# Patient Record
Sex: FEMALE | Race: WHITE | NOT HISPANIC OR LATINO | Employment: UNEMPLOYED | URBAN - METROPOLITAN AREA
[De-identification: names, ages, dates, MRNs, and addresses within clinical notes are randomized per-mention and may not be internally consistent; named-entity substitution may affect disease eponyms.]

---
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
## 2022-03-16 ENCOUNTER — OFFICE VISIT (OUTPATIENT)
Dept: FAMILY MEDICINE CLINIC | Facility: CLINIC | Age: 22
End: 2022-03-16
Payer: COMMERCIAL

## 2022-03-16 VITALS
HEIGHT: 66 IN | SYSTOLIC BLOOD PRESSURE: 116 MMHG | TEMPERATURE: 98.4 F | WEIGHT: 174.38 LBS | RESPIRATION RATE: 18 BRPM | HEART RATE: 100 BPM | BODY MASS INDEX: 28.03 KG/M2 | OXYGEN SATURATION: 98 % | DIASTOLIC BLOOD PRESSURE: 80 MMHG

## 2022-03-16 DIAGNOSIS — J02.9 PHARYNGITIS, UNSPECIFIED ETIOLOGY: Primary | ICD-10-CM

## 2022-03-16 PROCEDURE — 3008F BODY MASS INDEX DOCD: CPT | Performed by: FAMILY MEDICINE

## 2022-03-16 PROCEDURE — 3725F SCREEN DEPRESSION PERFORMED: CPT | Performed by: FAMILY MEDICINE

## 2022-03-16 PROCEDURE — 99202 OFFICE O/P NEW SF 15 MIN: CPT | Performed by: FAMILY MEDICINE

## 2022-03-16 PROCEDURE — 1036F TOBACCO NON-USER: CPT | Performed by: FAMILY MEDICINE

## 2022-03-16 RX ORDER — AZITHROMYCIN 250 MG/1
TABLET, FILM COATED ORAL
Qty: 6 TABLET | Refills: 0 | Status: SHIPPED | OUTPATIENT
Start: 2022-03-16 | End: 2022-03-21

## 2022-03-16 NOTE — ASSESSMENT & PLAN NOTE
Pharyngitis  Patient was given prescription for Zithromax Z-Chon take as directed for 5 days  She may use over-the-counter medications such as Robitussin DM for her cough and congestion    Patient will call if symptoms persist after medication completed

## 2022-03-16 NOTE — PROGRESS NOTES
FAMILY PRACTICE OFFICE VISIT       NAME: Marlin Sprague  AGE: 25 y o  SEX: female       : 2000        MRN: [id number]    DATE: 3/16/2022  TIME: 11:22 AM    Assessment and Plan     Problem List Items Addressed This Visit        Digestive    Pharyngitis - Primary     Pharyngitis  Patient was given prescription for Zithromax Z-Chon take as directed for 5 days  She may use over-the-counter medications such as Robitussin DM for her cough and congestion  Patient will call if symptoms persist after medication completed         Relevant Medications    azithromycin (Zithromax) 250 mg tablet              Chief Complaint     Chief Complaint   Patient presents with    [facility]     new pt     Sore Throat     x2 days    Cough     x 3 days     Chills     x 2 days    Nausea     x 1day       History of Present Illness     Patient states a few days ago she developed significant coughing and sore throat  She denies any documented fevers  She did have a negative COVID test yesterday  She is a student at [employer]  Patient does have a history of prior strep throat infections    Sore Throat   Associated symptoms include coughing  Cough  Associated symptoms include a sore throat  Pertinent negatives include no fever  Nausea  Associated symptoms include coughing, nausea and a sore throat  Pertinent negatives include no fever  Review of Systems   Review of Systems   Constitutional: Negative for fever  HENT: Positive for sore throat  Respiratory: Positive for cough  Gastrointestinal: Positive for nausea  Active Problem List     Patient Active Problem List   Diagnosis    Pharyngitis       Past Medical History:  Past Medical History:   Diagnosis Date    Asthma        Past Surgical History:  History reviewed  No pertinent surgical history      Family History:  Family History   Problem Relation Age of Onset    No Known Problems Mother     Hypertension Father     Diabetes Father     No Known Problems Brother        Social History:  Social History     Socioeconomic History    Marital status: Single     Spouse name: Not on file    Number of children: Not on file    Years of education: Not on file    Highest education level: Not on file   Occupational History    Not on file   Tobacco Use    Smoking status: Never Smoker    Smokeless tobacco: Never Used   Substance and Sexual Activity    Alcohol use: Yes    Drug use: Never    Sexual activity: Not on file   Other Topics Concern    Not on file   Social History Narrative    Not on file     Social Determinants of Health     Financial Resource Strain: Not on file   Food Insecurity: Not on file   Transportation Needs: Not on file   Physical Activity: Not on file   Stress: Not on file   Social Connections: Not on file   Intimate Partner Violence: Not on file   Housing Stability: Not on file       Objective     Vitals:    03/16/22 1033   BP: 116/80   Pulse: 100   Resp: 18   Temp: 98 4 °F (36 9 °C)   SpO2: 98%     Wt Readings from Last 3 Encounters:   03/16/22 79 1 kg (174 lb 6 oz) (93 %, Z= 1 50)*   02/05/18 68 kg (150 lb) (89 %, Z= 1 23)*   04/10/17 63 6 kg (140 lb 3 2 oz) (86 %, Z= 1 09)*     * Growth percentiles are based on CDC (Girls, 2-20 Years) data  Physical Exam  Constitutional:       General: She is not in acute distress  Appearance: She is well-developed  She is not ill-appearing  HENT:      Head: Normocephalic and atraumatic  Right Ear: Tympanic membrane, ear canal and external ear normal  There is no impacted cerumen  Left Ear: Tympanic membrane, ear canal and external ear normal  There is no impacted cerumen  Mouth/Throat:      Mouth: Mucous membranes are moist       Pharynx: Oropharyngeal exudate and posterior oropharyngeal erythema present  Tonsils: Tonsillar exudate present  Eyes:      General:         Right eye: No discharge  Left eye: No discharge        Extraocular Movements: Extraocular movements intact  Conjunctiva/sclera: Conjunctivae normal       Pupils: Pupils are equal, round, and reactive to light  Lymphadenopathy:      Cervical: Cervical adenopathy present  Neurological:      Mental Status: She is alert  Pertinent Laboratory/Diagnostic Studies:  No results found for: GLUCOSE, BUN, CREATININE, CALCIUM, NA, K, CO2, CL  No results found for: ALT, AST, GGT, ALKPHOS, BILITOT    No results found for: WBC, HGB, HCT, MCV, PLT    No results found for: TSH    No results found for: CHOL  No results found for: TRIG  No results found for: HDL  No results found for: LDLCALC  No results found for: HGBA1C    Results for orders placed or performed in visit on 12/27/21   [facility]    Specimen: Nose; Nares   Result Value Ref Range    SARS-CoV-2 Positive (A) Negative       No orders of the defined types were placed in this encounter  ALLERGIES:  Allergies   Allergen Reactions    Amoxicillin Hives       Current Medications     Current Outpatient Medications   Medication Sig Dispense Refill    azithromycin (Zithromax) 250 mg tablet Take 2 tablets (500 mg total) by mouth daily for 1 day, THEN 1 tablet (250 mg total) daily for 4 days  6 tablet 0    cetirizine (ZyrTEC) 10 mg tablet Take 1 tablet (10 mg total) by mouth daily for 10 doses As needed for nasal congestion and postnasal drip 10 tablet 0     No current facility-administered medications for this visit           Health Maintenance     Health Maintenance   Topic Date Due    Hepatitis C Screening  Never done    DTaP,Tdap,and Td Vaccines (1 - Tdap) Never done    HPV Vaccine (1 - 2-dose series) Never done    HIV Screening  Never done    BMI: Followup Plan  Never done    Annual Physical  Never done    Influenza Vaccine (1) Never done    COVID-19 Vaccine (3 - Booster for Pfizer series) 10/09/2021    Depression Screening  03/16/2023    BMI: Adult  03/16/2023    Pneumococcal Vaccine: Pediatrics (0 to 5 Years) and At-Risk Patients (6 to 59 Years)  Aged Out    HIB Vaccine  Aged Out    Hepatitis B Vaccine  Aged Out    IPV Vaccine  Aged Out    Hepatitis A Vaccine  Aged Out    Meningococcal ACWY Vaccine  Aged [employer] History   Administered Date(s) Administered    COVID-19 PFIZER VACCINE 0 3 ML IM 04/18/2021, 04/01/0414       Antoine Moctezuma MD

## 2022-08-15 ENCOUNTER — TELEPHONE (OUTPATIENT)
Dept: FAMILY MEDICINE CLINIC | Facility: CLINIC | Age: 22
End: 2022-08-15

## 2022-08-16 ENCOUNTER — CLINICAL SUPPORT (OUTPATIENT)
Dept: FAMILY MEDICINE CLINIC | Facility: CLINIC | Age: 22
End: 2022-08-16
Payer: COMMERCIAL

## 2022-08-16 DIAGNOSIS — Z11.1 TUBERCULOSIS SCREENING: Primary | ICD-10-CM

## 2022-08-16 PROCEDURE — 86580 TB INTRADERMAL TEST: CPT

## 2023-01-05 ENCOUNTER — TELEPHONE (OUTPATIENT)
Dept: FAMILY MEDICINE CLINIC | Facility: CLINIC | Age: 23
End: 2023-01-05

## 2023-01-09 ENCOUNTER — CLINICAL SUPPORT (OUTPATIENT)
Dept: FAMILY MEDICINE CLINIC | Facility: CLINIC | Age: 23
End: 2023-01-09

## 2023-01-09 DIAGNOSIS — Z11.1 TUBERCULOSIS SCREENING: Primary | ICD-10-CM

## 2023-01-11 ENCOUNTER — TELEPHONE (OUTPATIENT)
Dept: FAMILY MEDICINE CLINIC | Facility: CLINIC | Age: 23
End: 2023-01-11

## 2023-01-11 LAB
INDURATION: NORMAL MM
TB SKIN TEST: NEGATIVE

## 2023-01-11 NOTE — TELEPHONE ENCOUNTER
----- Message from James Schmidt MD sent at 9/78/5519 12:34 PM EST -----  Recent tuberculosis test was negative

## 2023-04-25 ENCOUNTER — OFFICE VISIT (OUTPATIENT)
Dept: FAMILY MEDICINE CLINIC | Facility: CLINIC | Age: 23
End: 2023-04-25

## 2023-04-25 ENCOUNTER — APPOINTMENT (OUTPATIENT)
Dept: LAB | Facility: CLINIC | Age: 23
End: 2023-04-25

## 2023-04-25 VITALS
HEART RATE: 81 BPM | HEIGHT: 67 IN | WEIGHT: 181 LBS | TEMPERATURE: 98 F | OXYGEN SATURATION: 97 % | RESPIRATION RATE: 16 BRPM | BODY MASS INDEX: 28.41 KG/M2

## 2023-04-25 DIAGNOSIS — J02.9 PHARYNGITIS, UNSPECIFIED ETIOLOGY: ICD-10-CM

## 2023-04-25 DIAGNOSIS — J02.9 PHARYNGITIS, UNSPECIFIED ETIOLOGY: Primary | ICD-10-CM

## 2023-04-25 RX ORDER — CEPHALEXIN 500 MG/1
500 CAPSULE ORAL EVERY 12 HOURS SCHEDULED
Qty: 14 CAPSULE | Refills: 0 | Status: CANCELLED | OUTPATIENT
Start: 2023-04-25 | End: 2023-05-02

## 2023-04-25 NOTE — LETTER
April 25, 2023     Patient: Josemanuel Ramírez  YOB: 2000  Date of Visit: 4/25/2023      To Whom it May Concern: Josemanuel Ramírez is under my professional care  Queta Oviedo was seen in my office on 4/25/2023  Queta Oviedo may return to school and work on 4/27/23  If you have any questions or concerns, please don't hesitate to call           Sincerely,          Raisa Massey,         CC: No Recipients

## 2023-04-25 NOTE — ASSESSMENT & PLAN NOTE
Recently completed a course of Azithromycin in the last few days  Rapid strep negative today  Will screen for Mono  Treat symptoms otherwise - saltwater gargle, lozenges, chloraseptic, honey in tea, mucinex, tylenol/ibuprofen

## 2023-04-25 NOTE — PROGRESS NOTES
"Name: Sangita Maza      : 2000      MRN: [id number]  Encounter Provider: Conchita Peter DO  Encounter Date: 2023   Encounter department: [address] [place] [address] Dr     1  Pharyngitis, unspecified etiology  Assessment & Plan:  Recently completed a course of Azithromycin in the last few days  Rapid strep negative today  Will screen for Mono  Treat symptoms otherwise - saltwater gargle, lozenges, chloraseptic, honey in tea, mucinex, tylenol/ibuprofen  Orders:  -     Mononucleosis screen; Future         Subjective      HPI   Had suspected strep last week, took Azithromycin over the last week  Then developed symptoms again after completing the course of antibiotics  Sore throat is resolved but she developed subjective fever, fatigue  Today she is having hot flashes throughout the day  She went to work today at [employer]  R side of throat feels swollen compared to left  She is developing a cough as well  No previous COVID test      Review of Systems   Constitutional: Positive for chills and fatigue  Negative for fever  HENT: Positive for congestion and sore throat  Respiratory: Negative for cough  Current Outpatient Medications on File Prior to Visit   Medication Sig   • cetirizine (ZyrTEC) 10 mg tablet Take 1 tablet (10 mg total) by mouth daily for 10 doses As needed for nasal congestion and postnasal drip       Objective     Pulse 81   Temp 98 °F (36 7 °C) (Temporal)   Resp 16   Ht 5' 7\" (1 702 m)   Wt 82 1 kg (181 lb)   SpO2 97%   BMI 28 35 kg/m²     Physical Exam  Vitals reviewed  Constitutional:       Appearance: She is well-developed  HENT:      Head: Normocephalic and atraumatic  Nose: No congestion or rhinorrhea  Mouth/Throat:      Mouth: Mucous membranes are moist       Pharynx: Posterior oropharyngeal erythema present  Tonsils: Tonsillar exudate present     Eyes:      Conjunctiva/sclera: Conjunctivae normal    Musculoskeletal:    " Cervical back: Normal range of motion and neck supple  Lymphadenopathy:      Cervical: Cervical adenopathy present  Skin:     General: Skin is warm and dry  Neurological:      Mental Status: She is alert     Psychiatric:         Mood and Affect: Mood normal          Behavior: Behavior normal        Clide Hammed, DO

## 2023-04-26 ENCOUNTER — APPOINTMENT (EMERGENCY)
Dept: CT IMAGING | Facility: HOSPITAL | Age: 23
End: 2023-04-26

## 2023-04-26 ENCOUNTER — HOSPITAL ENCOUNTER (EMERGENCY)
Facility: HOSPITAL | Age: 23
Discharge: HOME | End: 2023-04-26
Attending: EMERGENCY MEDICINE | Admitting: EMERGENCY MEDICINE

## 2023-04-26 VITALS
RESPIRATION RATE: 18 BRPM | SYSTOLIC BLOOD PRESSURE: 123 MMHG | HEART RATE: 91 BPM | DIASTOLIC BLOOD PRESSURE: 76 MMHG | BODY MASS INDEX: 29.35 KG/M2 | TEMPERATURE: 98.8 F | OXYGEN SATURATION: 97 % | WEIGHT: 187.39 LBS

## 2023-04-26 DIAGNOSIS — G93.89 SUPRASELLAR MASS: ICD-10-CM

## 2023-04-26 DIAGNOSIS — J03.90 TONSILLITIS: Primary | ICD-10-CM

## 2023-04-26 LAB
ALBUMIN SERPL BCP-MCNC: 3.6 G/DL (ref 3.5–5)
ALP SERPL-CCNC: 59 U/L (ref 34–104)
ALT SERPL W P-5'-P-CCNC: 13 U/L (ref 7–52)
ANION GAP SERPL CALCULATED.3IONS-SCNC: 7 MMOL/L (ref 4–13)
AST SERPL W P-5'-P-CCNC: 16 U/L (ref 13–39)
BASOPHILS # BLD AUTO: 0.02 THOUSANDS/ΜL (ref 0–0.1)
BASOPHILS NFR BLD AUTO: 0 % (ref 0–1)
BILIRUB SERPL-MCNC: 0.57 MG/DL (ref 0.2–1)
BUN SERPL-MCNC: 8 MG/DL (ref 5–25)
CALCIUM SERPL-MCNC: 8.3 MG/DL (ref 8.4–10.2)
CHLORIDE SERPL-SCNC: 101 MMOL/L (ref 96–108)
CO2 SERPL-SCNC: 26 MMOL/L (ref 21–32)
CREAT SERPL-MCNC: 0.65 MG/DL (ref 0.6–1.3)
EOSINOPHIL # BLD AUTO: 0 THOUSAND/ΜL (ref 0–0.61)
EOSINOPHIL NFR BLD AUTO: 0 % (ref 0–6)
ERYTHROCYTE [DISTWIDTH] IN BLOOD BY AUTOMATED COUNT: 11.8 % (ref 11.6–15.1)
GFR SERPL CREATININE-BSD FRML MDRD: 128 ML/MIN/1.73SQ M
GLUCOSE SERPL-MCNC: 95 MG/DL (ref 65–140)
HCT VFR BLD AUTO: 36.7 % (ref 34.8–46.1)
HETEROPH AB SER QL: NEGATIVE
HGB BLD-MCNC: 12.1 G/DL (ref 11.5–15.4)
IMM GRANULOCYTES # BLD AUTO: 0.04 THOUSAND/UL (ref 0–0.2)
IMM GRANULOCYTES NFR BLD AUTO: 1 % (ref 0–2)
LYMPHOCYTES # BLD AUTO: 1.3 THOUSANDS/ΜL (ref 0.6–4.47)
LYMPHOCYTES NFR BLD AUTO: 18 % (ref 14–44)
MCH RBC QN AUTO: 29.1 PG (ref 26.8–34.3)
MCHC RBC AUTO-ENTMCNC: 33 G/DL (ref 31.4–37.4)
MCV RBC AUTO: 88 FL (ref 82–98)
MONOCYTES # BLD AUTO: 0.57 THOUSAND/ΜL (ref 0.17–1.22)
MONOCYTES NFR BLD AUTO: 8 % (ref 4–12)
NEUTROPHILS # BLD AUTO: 5.34 THOUSANDS/ΜL (ref 1.85–7.62)
NEUTS SEG NFR BLD AUTO: 73 % (ref 43–75)
NRBC BLD AUTO-RTO: 0 /100 WBCS
PLATELET # BLD AUTO: 214 THOUSANDS/UL (ref 149–390)
PMV BLD AUTO: 9.1 FL (ref 8.9–12.7)
POTASSIUM SERPL-SCNC: 3.3 MMOL/L (ref 3.5–5.3)
PROT SERPL-MCNC: 7.2 G/DL (ref 6.4–8.4)
RBC # BLD AUTO: 4.16 MILLION/UL (ref 3.81–5.12)
S PYO DNA THROAT QL NAA+PROBE: NOT DETECTED
SODIUM SERPL-SCNC: 134 MMOL/L (ref 135–147)
WBC # BLD AUTO: 7.27 THOUSAND/UL (ref 4.31–10.16)

## 2023-04-26 RX ORDER — DEXAMETHASONE SODIUM PHOSPHATE 4 MG/ML
10 INJECTION, SOLUTION INTRA-ARTICULAR; INTRALESIONAL; INTRAMUSCULAR; INTRAVENOUS; SOFT TISSUE ONCE
Status: COMPLETED | OUTPATIENT
Start: 2023-04-26 | End: 2023-04-26

## 2023-04-26 RX ORDER — ACETAMINOPHEN 325 MG/1
975 TABLET ORAL ONCE
Status: COMPLETED | OUTPATIENT
Start: 2023-04-26 | End: 2023-04-26

## 2023-04-26 RX ORDER — KETOROLAC TROMETHAMINE 30 MG/ML
15 INJECTION, SOLUTION INTRAMUSCULAR; INTRAVENOUS ONCE
Status: COMPLETED | OUTPATIENT
Start: 2023-04-26 | End: 2023-04-26

## 2023-04-26 RX ORDER — CLINDAMYCIN HYDROCHLORIDE 300 MG/1
300 CAPSULE ORAL 3 TIMES DAILY
Qty: 29 CAPSULE | Refills: 0 | Status: SHIPPED | OUTPATIENT
Start: 2023-04-26 | End: 2023-05-06

## 2023-04-26 RX ORDER — CLINDAMYCIN PHOSPHATE 600 MG/50ML
600 INJECTION INTRAVENOUS ONCE
Status: COMPLETED | OUTPATIENT
Start: 2023-04-26 | End: 2023-04-26

## 2023-04-26 RX ADMIN — ACETAMINOPHEN 975 MG: 325 TABLET ORAL at 20:53

## 2023-04-26 RX ADMIN — DEXAMETHASONE SODIUM PHOSPHATE 10 MG: 4 INJECTION, SOLUTION INTRAMUSCULAR; INTRAVENOUS at 21:08

## 2023-04-26 RX ADMIN — SODIUM CHLORIDE 1000 ML: 0.9 INJECTION, SOLUTION INTRAVENOUS at 21:14

## 2023-04-26 RX ADMIN — CLINDAMYCIN PHOSPHATE 600 MG: 600 INJECTION, SOLUTION INTRAVENOUS at 22:51

## 2023-04-26 RX ADMIN — IOHEXOL 85 ML: 350 INJECTION, SOLUTION INTRAVENOUS at 21:49

## 2023-04-26 RX ADMIN — KETOROLAC TROMETHAMINE 15 MG: 30 INJECTION, SOLUTION INTRAMUSCULAR at 21:04

## 2023-04-26 NOTE — Clinical Note
Mini Delvalle was seen and treated in our emergency department on 4/26/2023  Diagnosis:     Louise Desir  may return to work on return date, may return to school on return date  She may return on this date: 05/01/2023         If you have any questions or concerns, please don't hesitate to call        Nadeem Hyman MD    ______________________________           _______________          _______________  Hospital Representative                              Date                                Time

## 2023-04-27 ENCOUNTER — OFFICE VISIT (OUTPATIENT)
Dept: FAMILY MEDICINE CLINIC | Facility: CLINIC | Age: 23
End: 2023-04-27

## 2023-04-27 ENCOUNTER — HOSPITAL ENCOUNTER (OUTPATIENT)
Dept: MRI IMAGING | Facility: HOSPITAL | Age: 23
Discharge: HOME | End: 2023-04-27

## 2023-04-27 VITALS
TEMPERATURE: 98 F | DIASTOLIC BLOOD PRESSURE: 80 MMHG | SYSTOLIC BLOOD PRESSURE: 120 MMHG | WEIGHT: 182 LBS | RESPIRATION RATE: 16 BRPM | BODY MASS INDEX: 28.56 KG/M2 | HEIGHT: 67 IN | HEART RATE: 120 BPM | OXYGEN SATURATION: 98 %

## 2023-04-27 DIAGNOSIS — R93.89 ABNORMAL CT SCAN: ICD-10-CM

## 2023-04-27 DIAGNOSIS — R93.89 ABNORMAL CT SCAN: Primary | ICD-10-CM

## 2023-04-27 DIAGNOSIS — J03.90 TONSILLITIS: ICD-10-CM

## 2023-04-27 LAB — HETEROPH AB SER QL: NEGATIVE

## 2023-04-27 RX ADMIN — GADOBUTROL 8 ML: 604.72 INJECTION INTRAVENOUS at 14:46

## 2023-04-27 NOTE — ED PROVIDER NOTES
History  Chief Complaint   Patient presents with   • Sore Throat - Complicated     Pt reports sore throat and swollen tonsils with intermittent fatigue, dizziness, SOB, chills, fevers, abdominal pain for 2 weeks  Reports being probably diagnosed with strep last week, was cleared of [place] yesterday  Pt finished zpac last Friday  74-year-old female presents to the emergency department with chief complaint of sore throat that began 2 weeks ago  Patient states that she is been experiencing sore throat, fatigue, fevers for roughly 2 weeks, was evaluated by her primary care doctor told that it was most likely strep placed on a Z-Chon, but also had a monotest that came back negative  Patient notes that she also has experienced posttussive emesis, but has also had an occasional gag induced emesis, and 1 episode of emesis that was precipitated by nausea  Patient notes that she has been trying to take Tylenol as well as ibuprofen with minimal improvement of her fever  States that she is currently on her menses  Patient denies any chest pain, or shortness of breath, however feels as it is slightly difficult to have a deep breath due to the swelling in her throat  Patient states that she has not had any change in urination, or in bowel habits, rash, or any other complaints at this time  Prior to Admission Medications   Prescriptions Last Dose Informant Patient Reported? Taking? cetirizine (ZyrTEC) 10 mg tablet   No No   Sig: Take 1 tablet (10 mg total) by mouth daily for 10 doses As needed for nasal congestion and postnasal drip      Facility-Administered Medications: None       Past Medical History:   Diagnosis Date   • Asthma        History reviewed  No pertinent surgical history      Family History   Problem Relation Age of Onset   • No Known Problems Mother    • Hypertension Father    • Diabetes Father    • No Known Problems Brother      I have reviewed and agree with the history as documented  E-Cigarette/Vaping   • E-Cigarette Use Never User      E-Cigarette/Vaping Substances   • Nicotine No    • THC No    • CBD No    • Flavoring No    • Other No    • Unknown No      Social History     Tobacco Use   • Smoking status: Never   • Smokeless tobacco: Never   Vaping Use   • Vaping Use: Never used   Substance Use Topics   • Alcohol use: Yes   • Drug use: Never        Review of Systems   Constitutional: Positive for appetite change (decreased), chills, fatigue and fever  HENT: Positive for sore throat and trouble swallowing (odynophagia)  Negative for congestion, drooling and ear pain  Eyes: Negative for pain and visual disturbance  Respiratory: Positive for cough (non-productive)  Negative for shortness of breath  Cardiovascular: Negative for chest pain and palpitations  Gastrointestinal: Positive for abdominal pain, nausea and vomiting  Genitourinary: Negative for dysuria and hematuria  Musculoskeletal: Negative for arthralgias and back pain  Skin: Negative for color change and rash  Neurological: Negative for seizures and syncope  All other systems reviewed and are negative  Physical Exam  ED Triage Vitals   Temperature Pulse Respirations Blood Pressure SpO2   04/26/23 2005 04/26/23 2005 04/26/23 2005 04/26/23 2005 04/26/23 2005   100 5 °F (38 1 °C) (!) 120 18 127/95 96 %      Temp Source Heart Rate Source Patient Position - Orthostatic VS BP Location FiO2 (%)   04/26/23 2005 04/26/23 2005 04/26/23 2253 04/26/23 2253 --   Oral Monitor Lying Right arm       Pain Score       04/26/23 2053       7             Orthostatic Vital Signs  Vitals:    04/26/23 2005 04/26/23 2253   BP: 127/95 123/76   Pulse: (!) 120 91   Patient Position - Orthostatic VS:  Lying       Physical Exam  Vitals and nursing note reviewed  Constitutional:       Appearance: She is well-developed  Comments: Uncomfortable appearing   HENT:      Head: Normocephalic and atraumatic        Right Ear: Tympanic membrane, ear canal and external ear normal  There is no impacted cerumen  Left Ear: Tympanic membrane, ear canal and external ear normal  There is no impacted cerumen  Nose: Nose normal       Mouth/Throat:      Pharynx: Oropharyngeal exudate and posterior oropharyngeal erythema present  Comments: 2+ tonsillar hypertrophy  Eyes:      General: No scleral icterus  Right eye: No discharge  Left eye: No discharge  Conjunctiva/sclera: Conjunctivae normal    Cardiovascular:      Rate and Rhythm: Normal rate and regular rhythm  Heart sounds: No murmur heard  Pulmonary:      Effort: Pulmonary effort is normal  No respiratory distress  Breath sounds: Normal breath sounds  Abdominal:      General: Abdomen is flat  Bowel sounds are normal       Palpations: Abdomen is soft  Tenderness: There is abdominal tenderness (Mild suprapubic)  There is no right CVA tenderness, left CVA tenderness, guarding or rebound  Musculoskeletal:         General: No swelling  Cervical back: Normal range of motion and neck supple  No tenderness  Lymphadenopathy:      Cervical: Cervical adenopathy (Soft, rubbery, mobile anterior chain) present  Skin:     General: Skin is warm and dry  Capillary Refill: Capillary refill takes less than 2 seconds  Neurological:      Mental Status: She is alert     Psychiatric:         Mood and Affect: Mood normal          ED Medications  Medications   acetaminophen (TYLENOL) tablet 975 mg (975 mg Oral Given 4/26/23 2053)   sodium chloride 0 9 % bolus 1,000 mL (0 mL Intravenous Stopped 4/26/23 2337)   dexamethasone (DECADRON) injection 10 mg (10 mg Intravenous Given 4/26/23 2108)   ketorolac (TORADOL) injection 15 mg (15 mg Intravenous Given 4/26/23 2104)   iohexol (OMNIPAQUE) 350 MG/ML injection (SINGLE-DOSE) 85 mL (85 mL Intravenous Given 4/26/23 2149)   clindamycin (CLEOCIN) IVPB (premix in dextrose) 600 mg 50 mL (0 mg Intravenous Stopped 4/26/23 2326)       Diagnostic Studies  Results Reviewed     Procedure Component Value Units Date/Time    Strep A PCR [[id number]]  (Normal) Collected: 04/26/23 2052    Lab Status: Final result Specimen: Throat Updated: 04/26/23 2149     STREP A PCR Not Detected    Comprehensive metabolic panel [[id number]]  (Abnormal) Collected: 04/26/23 2102    Lab Status: Final result Specimen: Blood from Arm, Right Updated: 04/26/23 2138     Sodium 134 mmol/L      Potassium 3 3 mmol/L      Chloride 101 mmol/L      CO2 26 mmol/L      ANION GAP 7 mmol/L      BUN 8 mg/dL      Creatinine 0 65 mg/dL      Glucose 95 mg/dL      Calcium 8 3 mg/dL      AST 16 U/L      ALT 13 U/L      Alkaline Phosphatase 59 U/L      Total Protein 7 2 g/dL      Albumin 3 6 g/dL      Total Bilirubin 0 57 mg/dL      eGFR 128 ml/min/1 73sq m     Narrative:      [place] guidelines for Chronic Kidney Disease (CKD):   •  Stage 1 with normal or high GFR (GFR > 90 mL/min/1 73 square meters)  •  Stage 2 Mild CKD (GFR = 60-89 mL/min/1 73 square meters)  •  Stage 3A Moderate CKD (GFR = 45-59 mL/min/1 73 square meters)  •  Stage 3B Moderate CKD (GFR = 30-44 mL/min/1 73 square meters)  •  Stage 4 Severe CKD (GFR = 15-29 mL/min/1 73 square meters)  •  Stage 5 End Stage CKD (GFR <15 mL/min/1 73 square meters)  Note: GFR calculation is accurate only with a steady state creatinine    CBC and differential [[id number]] Collected: 04/26/23 2102    Lab Status: Final result Specimen: Blood from Arm, Right Updated: 04/26/23 2125     WBC 7 27 Thousand/uL      RBC 4 16 Million/uL      Hemoglobin 12 1 g/dL      Hematocrit 36 7 %      MCV 88 fL      MCH 29 1 pg      MCHC 33 0 g/dL      RDW 11 8 %      MPV 9 1 fL      Platelets 720 Thousands/uL      nRBC 0 /100 WBCs      Neutrophils Relative 73 %      Immat GRANS % 1 %      Lymphocytes Relative 18 %      Monocytes Relative 8 %      Eosinophils Relative 0 %      Basophils Relative 0 %      Neutrophils Absolute 5 34 Thousands/µL      Immature Grans Absolute 0 04 Thousand/uL      Lymphocytes Absolute 1 30 Thousands/µL      Monocytes Absolute 0 57 Thousand/µL      Eosinophils Absolute 0 00 Thousand/µL      Basophils Absolute 0 02 Thousands/µL     Mononucleosis screen [[id number]] Collected: 04/26/23 2102    Lab Status: In process Specimen: Blood from Arm, Right Updated: 04/26/23 2118                 CT soft tissue neck with contrast   ED Interpretation by Jimmy Kennedy MD (04/26 2323)   PROCEDURE INFORMATION:  Exam: CT Neck With Contrast  Exam date and time: 4/26/2023 9:47 PM  Age: 23years old  Clinical indication: Throat pain; Patient HX: Sore throat and difficulty speaking  TECHNIQUE:  Imaging protocol: Computed tomography of the neck with contrast   COMPARISON:  No relevant prior studies available  FINDINGS:  Pharynx: Bilateral enlargement of palatine tonsils, consistent with acute tonsillitis  There is no  evidence of focal fluid collections to suggest abscess formation  There is severe diffuse  nasopharyngeal mucosal thickening and adenoids enlargement, suggestive underlying infectious  process  Larynx: Mild swelling of the epiglottis  Prevertebral and retropharyngeal spaces: Unremarkable  Salivary glands: Normal  Glands are normal in size  Thyroid: The thyroid gland is normal   Lymph nodes: There are multiple enlarged nonspecific cervical nodes  Trachea: Visualized trachea is unremarkable  Lungs: The visualized portions of the lung apices are normal   Kavin   nick/joints: Unremarkable  No acute fracture  Soft tissues: Unremarkable  No significant soft tissue swelling  IMPRESSION:  1  Bilateral enlargement of palatine tonsils, consistent with severe acute tonsillitis  No peritonsillar or  intra tonsillar fluid collections to suggest abscess formation    2  Concomitant severe diffuse nasopharyngeal mucosal edema, adenoidal enlargement and mild  epiglottic swelling, consistent with reactive process and supraglottitis component  3  Multiple enlarged reactive cervical nodes  Thank you for allowing us to participate in the care of your patient  Dictated and Authenticated by: Isabela Arellano MD  04/26/2023 11:20 PM [place] Time ([address] 1154            Procedures  Procedures      ED Course                                       Medical Decision Making  27-year-old female presents to the emergency department with chief complaint of sore throat that began 2 weeks ago  She was seen and examined noted to have cervical lymphadenopathy, 2+ tonsillar hypertrophy as well as exudate and erythema in addition to mild suprapubic tenderness  Patient had mentioned she is currently on her menses  Due to patient's history and presentation CBC, CMP, strep was obtained showed no acute pertinent findings  Monospot is pending  CT of the soft tissues of the neck was obtained which showed Bilateral enlargement of palatine tonsils, consistent with severe acute tonsillitis  No peritonsillar or intra tonsillar fluid collections to suggest abscess formation  Concomitant severe diffuse nasopharyngeal mucosal edema, adenoidal enlargement and mild epiglottic swelling, consistent with reactive process and supraglottitis component  Patient was given IV fluids, Decadron, Toradol, Tylenol as well as clindamycin  Patient was given prescription for clindamycin  Patient was given strict return precautions  Patient was given ambulatory referral to ENT as well as provided for number for clinic  Answered all questions  Patient appears well, is nontoxic appearing, expresses understanding and agrees with plan of care at this time  In light of this patient would benefit from outpatient management  Tonsillitis: acute illness or injury  Amount and/or Complexity of Data Reviewed  Labs: ordered  Radiology: ordered and independent interpretation performed  Risk  OTC drugs  Prescription drug management              Disposition  Final diagnoses: Tonsillitis     Time reflects when diagnosis was documented in both MDM as applicable and the Disposition within this note     Time User Action Codes Description Comment    4/26/2023 11:29 PM [id number] Tonsillitis       ED Disposition     ED Disposition   Discharge    Condition   Stable    Date/Time   Wed Apr 26, 2023 11:29 PM    Comment   Jesus Herrera discharge to home/self care  Follow-up Information     Follow up With Specialties Details Why [address] Bill Hurley MD [facility]   [address] [place] [address]      Reynold Whyte MD Otolaryngology   [address] [address]  [phone]            Discharge Medication List as of 4/26/2023 11:30 PM      START taking these medications    Details   clindamycin (CLEOCIN) 300 MG capsule Take 1 capsule (300 mg total) by mouth 3 (three) times a day for 29 doses, Starting Wed 4/26/2023, Until Sat 5/6/2023, Normal         CONTINUE these medications which have NOT CHANGED    Details   cetirizine (ZyrTEC) 10 mg tablet Take 1 tablet (10 mg total) by mouth daily for 10 doses As needed for nasal congestion and postnasal drip, Starting Mon 2/5/2018, Until Tue 4/25/2023, Print               PDMP Review     None           ED Provider  Attending physically available and evaluated Jesus Herrera I managed the patient along with the ED Attending      Electronically Signed by         Yocasta Murrieta MD  04/26/23 2078

## 2023-04-27 NOTE — ED ATTENDING ATTESTATION
4/26/2023  I, Olga Cehung MD, saw and evaluated the patient  I have discussed the patient with the resident/non-physician practitioner and agree with the resident's/non-physician practitioner's findings, Plan of Care, and MDM as documented in the resident's/non-physician practitioner's note, except where noted  All available labs and Radiology studies were reviewed  I was present for key portions of any procedure(s) performed by the resident/non-physician practitioner and I was immediately available to provide assistance  At this point I agree with the current assessment done in the Emergency Department  I have conducted an independent evaluation of this patient a history and physical is as follows: Persistent sore throat despite outpatient treatment azithromycin  Bilateral tonsillar swelling, voice change, will check labs, CT scan, medicate with Decadron, IV antibiotics, IV Toradol, IV fluids  Labs unremarkable, CT with tonsillitis, no drainable abscess, IV clindamycin in ED, 10-day course at home, PCP and ENT follow-up recommended, return precaution discussed, patient clinically improving after ER management, tolerating secretions, tolerating oral intake, stable at time of discharge home      Results Reviewed     Procedure Component Value Units Date/Time    Strep A PCR [[id number]]  (Normal) Collected: 04/26/23 2052    Lab Status: Final result Specimen: Throat Updated: 04/26/23 2149     STREP A PCR Not Detected    Comprehensive metabolic panel [[id number]]  (Abnormal) Collected: 04/26/23 2102    Lab Status: Final result Specimen: Blood from Arm, Right Updated: 04/26/23 2138     Sodium 134 mmol/L      Potassium 3 3 mmol/L      Chloride 101 mmol/L      CO2 26 mmol/L      ANION GAP 7 mmol/L      BUN 8 mg/dL      Creatinine 0 65 mg/dL      Glucose 95 mg/dL      Calcium 8 3 mg/dL      AST 16 U/L      ALT 13 U/L      Alkaline Phosphatase 59 U/L      Total Protein 7 2 g/dL      Albumin 3 6 g/dL Total Bilirubin 0 57 mg/dL      eGFR 128 ml/min/1 73sq m     Narrative:      [place] guidelines for Chronic Kidney Disease (CKD):   •  Stage 1 with normal or high GFR (GFR > 90 mL/min/1 73 square meters)  •  Stage 2 Mild CKD (GFR = 60-89 mL/min/1 73 square meters)  •  Stage 3A Moderate CKD (GFR = 45-59 mL/min/1 73 square meters)  •  Stage 3B Moderate CKD (GFR = 30-44 mL/min/1 73 square meters)  •  Stage 4 Severe CKD (GFR = 15-29 mL/min/1 73 square meters)  •  Stage 5 End Stage CKD (GFR <15 mL/min/1 73 square meters)  Note: GFR calculation is accurate only with a steady state creatinine    CBC and differential [[id number]] Collected: 04/26/23 2102    Lab Status: Final result Specimen: Blood from Arm, Right Updated: 04/26/23 2125     WBC 7 27 Thousand/uL      RBC 4 16 Million/uL      Hemoglobin 12 1 g/dL      Hematocrit 36 7 %      MCV 88 fL      MCH 29 1 pg      MCHC 33 0 g/dL      RDW 11 8 %      MPV 9 1 fL      Platelets 027 Thousands/uL      nRBC 0 /100 WBCs      Neutrophils Relative 73 %      Immat GRANS % 1 %      Lymphocytes Relative 18 %      Monocytes Relative 8 %      Eosinophils Relative 0 %      Basophils Relative 0 %      Neutrophils Absolute 5 34 Thousands/µL      Immature Grans Absolute 0 04 Thousand/uL      Lymphocytes Absolute 1 30 Thousands/µL      Monocytes Absolute 0 57 Thousand/µL      Eosinophils Absolute 0 00 Thousand/µL      Basophils Absolute 0 02 Thousands/µL     Mononucleosis screen [[id number]] Collected: 04/26/23 2102    Lab Status: In process Specimen: Blood from Arm, Right Updated: 04/26/23 2118        CT soft tissue neck with contrast   ED Interpretation by Nadeem Hyman MD (04/26 0018)   PROCEDURE INFORMATION:  Exam: CT Neck With Contrast  Exam date and time: 4/26/2023 9:47 PM  Age: 23years old  Clinical indication: Throat pain;  Patient HX: Sore throat and difficulty speaking  TECHNIQUE:  Imaging protocol: Computed tomography of the neck with contrast   COMPARISON:  No relevant prior studies available  FINDINGS:  Pharynx: Bilateral enlargement of palatine tonsils, consistent with acute tonsillitis  There is no  evidence of focal fluid collections to suggest abscess formation  There is severe diffuse  nasopharyngeal mucosal thickening and adenoids enlargement, suggestive underlying infectious  process  Larynx: Mild swelling of the epiglottis  Prevertebral and retropharyngeal spaces: Unremarkable  Salivary glands: Normal  Glands are normal in size  Thyroid: The thyroid gland is normal   Lymph nodes: There are multiple enlarged nonspecific cervical nodes  Trachea: Visualized trachea is unremarkable  Lungs: The visualized portions of the lung apices are normal   Kavin   nick/joints: Unremarkable  No acute fracture  Soft tissues: Unremarkable  No significant soft tissue swelling  IMPRESSION:  1  Bilateral enlargement of palatine tonsils, consistent with severe acute tonsillitis  No peritonsillar or  intra tonsillar fluid collections to suggest abscess formation  2  Concomitant severe diffuse nasopharyngeal mucosal edema, adenoidal enlargement and mild  epiglottic swelling, consistent with reactive process and supraglottitis component  3  Multiple enlarged reactive cervical nodes  Thank you for allowing us to participate in the care of your patient  Dictated and Authenticated by: Steffanie Nyhan, MD  04/26/2023 11:20 PM [facility] ([facility] [place] 1159            ED Course  ED Course as of 04/26/23 2334   Wed Apr 26, 2023   2325 CT sent to [facility] for overnight read, impression bilateral enlargement of palatine tonsils consistent with severe acute tonsillitis, no peritonsillar intratonsillar fluid collections to suggest abscess formation  Severe diffuse nasopharyngeal mucosal edema, adenoidal enlargement and mild epiglottic swelling consistent with reactive process           Critical Care Time  Procedures

## 2023-04-27 NOTE — ED PROVIDER NOTES
Was notified by radiology that there was additional reading for CT soft tissue neck with contrast and there had been a noted mass measuring 1 1 cm consisting of fat density and dystrophic calcification/ossification located in the posterior aspect of the suprasellar cistern was suspicious for ostial lipoma  Patient would require more definitive characterization with noncontrast and contrast-enhanced brain MRI  Patient was called on her cell phone at 8610 with no answer and was called again and 07 15 and was notified of findings  Patient was notified that she should follow-up with her primary care physician regarding these findings and seek further imaging including but not limited to a noncontrast and contrast-enhanced brain MRI  Patient was reassured and advised that findings would be attached to the report and that she could review with him on her MyChart  Please review imaging for further details       José Armijo MD  04/27/23 2161 Radha Davis MD  04/27/23 1134

## 2023-04-27 NOTE — PROGRESS NOTES
FAMILY PRACTICE OFFICE VISIT       NAME: Gurpreet Mendez  AGE: 23 y o  SEX: female       : 2000        MRN: [id number]    Assessment and Plan   1  Abnormal CT scan  -     MRI brain w wo contrast; Future; Expected date: 2023    2  Tonsillitis     seen in office treated with z-joseph     seen in office, not getting better, sent for mono-test       treated in ED for tonsillitis, IV decadron  And clindamycin administered  Mono screen negative, strep A PCR negative  CBC with diff unremarkable  She is starting to feel better, tonsil swelling seems to be going down  She will plan to schedule with ENT as recommended in ED  CT soft tissue neck completed in ED yesterday with 1 1 cm suprasellar cistern mass suspected osteolipoma  Needs MRI for further clarification  MRI ordered  She will schedule  They will call with any further questions or concerns  CT soft tissue neck completed in ED:  Acute tonsillitis  Thickening and enhancement of nasopharyngeal mucosa, thickening of the lingual tonsils, and thickening of mucosa in the oropharynx/epiglottis also consistent with supraglottic infectious process      No tonsillar, peritonsillar, or other neck abscess      Mass measuring 1 1 cm consisting of fat density and dystrophic calcification/ossification located in the posterior aspect of the suprasellar cistern most suspicious for osteolipoma  More definitive characterization with noncontrast and contrast-enhanced   brain MRI is recommended        Chief Complaint     Chief Complaint   Patient presents with   • Follow-up     Pt is here for f/u ER CT of the head       History of Present Illness     Gurpreet Mendez is a 23year old female presenting today for ED follow up  Went to ED last night for persistent sore throat      seen in office treated with z-joseph     seen in office, not getting better, sent for mono-test       treated in ED for tonsillitis, IV decadron  And clindamycin "administered  She is starting to feel better, tonsil swelling seems to be going down  She will plan to schedule with ENT as recommended in ED  She was notified there was a mass noted on CT scan, and she needed to follow up with PCP  Accompanied by mom and dad today  Review of Systems   Review of Systems   Constitutional: Negative  HENT: Positive for congestion, sore throat and voice change  I have reviewed the patient's medical history in detail; there are no changes to the history as noted in the electronic medical record  Objective     Vitals:    04/27/23 0951   BP: 120/80   Pulse: (!) 120   Resp: 16   Temp: 98 °F (36 7 °C)   TempSrc: Temporal   SpO2: 98%   Weight: 82 6 kg (182 lb)   Height: 5' 7\" (1 702 m)     Wt Readings from Last 3 Encounters:   04/27/23 82 6 kg (182 lb)   04/26/23 85 kg (187 lb 6 3 oz)   04/25/23 82 1 kg (181 lb)     Physical Exam  Vitals and nursing note reviewed  Constitutional:       General: She is not in acute distress  Appearance: Normal appearance  She is not ill-appearing  HENT:      Right Ear: Tympanic membrane normal       Left Ear: Tympanic membrane normal       Nose: No congestion or rhinorrhea  Mouth/Throat:      Pharynx: Pharyngeal swelling, oropharyngeal exudate and posterior oropharyngeal erythema present  Tonsils: Tonsillar exudate present  No tonsillar abscesses  2+ on the right  2+ on the left  Cardiovascular:      Rate and Rhythm: Normal rate and regular rhythm  Heart sounds: No murmur heard  Pulmonary:      Effort: Pulmonary effort is normal       Breath sounds: Normal breath sounds  Neurological:      Mental Status: She is alert     Psychiatric:         Mood and Affect: Mood normal             ALLERGIES:  Allergies   Allergen Reactions   • Amoxicillin Hives       Current Medications     Current Outpatient Medications   Medication Sig Dispense Refill   • cetirizine (ZyrTEC) 10 mg tablet Take 1 tablet (10 mg total) by " mouth daily for 10 doses As needed for nasal congestion and postnasal drip 10 tablet 0   • clindamycin (CLEOCIN) 300 MG capsule Take 1 capsule (300 mg total) by mouth 3 (three) times a day for 29 doses 29 capsule 0     No current facility-administered medications for this visit           Health Maintenance     Health Maintenance   Topic Date Due   • Hepatitis C Screening  Never done   • DTaP,Tdap,and Td Vaccines (1 - Tdap) Never done   • HPV Vaccine (1 - 2-dose series) Never done   • HIV Screening  Never done   • BMI: Followup Plan  Never done   • Annual Physical  Never done   • COVID-19 Vaccine (3 - Booster for Pfizer series) 07/04/2021   • Influenza Vaccine (1) 06/30/2023 (Originally 9/1/2022)   • Depression Screening  04/18/2024   • BMI: Adult  04/27/2024   • Pneumococcal Vaccine: Pediatrics (0 to 5 Years) and At-Risk Patients (6 to 59 Years)  Aged Out   • HIB Vaccine  Aged Out   • IPV Vaccine  Aged Out   • Hepatitis A Vaccine  Aged Out   • Meningococcal ACWY Vaccine  Aged [employer] History   Administered Date(s) Administered   • COVID-19 PFIZER VACCINE 0 3 ML IM 04/18/2021, 05/09/2021   • Tuberculin Skin Test-PPD Intradermal 08/16/2022, 01/09/2023       KOSTAS Chu

## 2023-08-29 ENCOUNTER — CLINICAL SUPPORT (OUTPATIENT)
Dept: FAMILY MEDICINE CLINIC | Facility: CLINIC | Age: 23
End: 2023-08-29
Payer: COMMERCIAL

## 2023-08-29 DIAGNOSIS — Z11.1 TUBERCULOSIS SCREENING: Primary | ICD-10-CM

## 2023-08-29 PROCEDURE — 86580 TB INTRADERMAL TEST: CPT | Performed by: FAMILY MEDICINE

## 2023-08-31 LAB
INDURATION: 0 MM
TB SKIN TEST: NEGATIVE

## 2023-09-18 ENCOUNTER — OFFICE VISIT (OUTPATIENT)
Dept: FAMILY MEDICINE CLINIC | Facility: CLINIC | Age: 23
End: 2023-09-18
Payer: COMMERCIAL

## 2023-09-18 VITALS
WEIGHT: 188 LBS | HEIGHT: 67 IN | OXYGEN SATURATION: 100 % | SYSTOLIC BLOOD PRESSURE: 100 MMHG | RESPIRATION RATE: 18 BRPM | HEART RATE: 90 BPM | BODY MASS INDEX: 29.51 KG/M2 | DIASTOLIC BLOOD PRESSURE: 80 MMHG | TEMPERATURE: 97 F

## 2023-09-18 DIAGNOSIS — J02.9 PHARYNGITIS, UNSPECIFIED ETIOLOGY: ICD-10-CM

## 2023-09-18 DIAGNOSIS — J02.9 SORE THROAT: Primary | ICD-10-CM

## 2023-09-18 LAB — S PYO AG THROAT QL: NEGATIVE

## 2023-09-18 PROCEDURE — 99213 OFFICE O/P EST LOW 20 MIN: CPT | Performed by: FAMILY MEDICINE

## 2023-09-18 PROCEDURE — 87880 STREP A ASSAY W/OPTIC: CPT | Performed by: FAMILY MEDICINE

## 2023-09-18 RX ORDER — IBUPROFEN 600 MG/1
TABLET ORAL
COMMUNITY
Start: 2023-06-16 | End: 2023-09-18

## 2023-09-18 RX ORDER — AZITHROMYCIN 250 MG/1
TABLET, FILM COATED ORAL
Qty: 6 TABLET | Refills: 0 | Status: SHIPPED | OUTPATIENT
Start: 2023-09-18 | End: 2023-09-23

## 2023-09-18 NOTE — ASSESSMENT & PLAN NOTE
Pharyngitis. Patient given prescription for Zithromax Z-Chon to take as directed for 5 days.   Patient will call if symptoms persist after medication completed

## 2023-09-18 NOTE — PROGRESS NOTES
FAMILY PRACTICE OFFICE VISIT       NAME: Armando Casarez  AGE: 23 y.o. SEX: female       : 2000        MRN: [id number]    DATE: 2023  TIME: 1:02 PM    Assessment and Plan     Problem List Items Addressed This Visit        Digestive    Pharyngitis     Pharyngitis. Patient given prescription for Zithromax Z-Chon to take as directed for 5 days. Patient will call if symptoms persist after medication completed         Relevant Medications    azithromycin (Zithromax) 250 mg tablet       Other    RESOLVED: Sore throat - Primary    Relevant Orders    POCT rapid strepA (Completed)           Chief Complaint     Chief Complaint   Patient presents with   • Sore Throat       History of Present Illness     Patient states she developed fevers, fatigue, and sore throat 2 days ago. She had a similar incident in 2023 that was diagnosed with strep throat. She works at a  but does not recall being around any sick individuals. Patient has been taking Tylenol for any fevers. Review of Systems   Review of Systems   Constitutional: Positive for fatigue and fever. HENT: Positive for sore throat. Respiratory: Negative. Cardiovascular: Negative. Gastrointestinal: Negative. Skin: Negative. Active Problem List     Patient Active Problem List   Diagnosis   • Pharyngitis       Past Medical History:  Past Medical History:   Diagnosis Date   • Asthma        Past Surgical History:  History reviewed. No pertinent surgical history.     Family History:  Family History   Problem Relation Age of Onset   • No Known Problems Mother    • Hypertension Father    • Diabetes Father    • No Known Problems Brother        Social History:  Social History     Socioeconomic History   • Marital status: Single     Spouse name: Not on file   • Number of children: Not on file   • Years of education: Not on file   • Highest education level: Not on file   Occupational History   • Not on file   Tobacco Use   • Smoking status: Never   • Smokeless tobacco: Never   Vaping Use   • Vaping Use: Never used   Substance and Sexual Activity   • Alcohol use: Not Currently   • Drug use: Never   • Sexual activity: Not Currently   Other Topics Concern   • Not on file   Social History Narrative   • Not on file     Social Determinants of Health     Financial Resource Strain: Not on file   Food Insecurity: Not on file   Transportation Needs: Not on file   Physical Activity: Not on file   Stress: Not on file   Social Connections: Not on file   Intimate Partner Violence: Not on file   Housing Stability: Not on file       Objective     Vitals:    09/18/23 1017   BP: 100/80   Pulse: 90   Resp: 18   Temp: (!) 97 °F (36.1 °C)   SpO2: 100%     Wt Readings from Last 3 Encounters:   09/18/23 85.3 kg (188 lb)   04/27/23 82.6 kg (182 lb)   04/26/23 85 kg (187 lb 6.3 oz)       Physical Exam  Constitutional:       General: She is not in acute distress. Appearance: Normal appearance. She is not ill-appearing. HENT:      Head: Normocephalic and atraumatic. Right Ear: Tympanic membrane, ear canal and external ear normal. There is no impacted cerumen. Left Ear: Tympanic membrane, ear canal and external ear normal. There is no impacted cerumen. Mouth/Throat:      Pharynx: Oropharyngeal exudate and posterior oropharyngeal erythema present. Comments: Large bilateral tonsils with white exudates on right side. Eyes:      General:         Right eye: No discharge. Left eye: No discharge. Conjunctiva/sclera: Conjunctivae normal.      Pupils: Pupils are equal, round, and reactive to light. Neck:      Vascular: No carotid bruit. Cardiovascular:      Rate and Rhythm: Normal rate and regular rhythm. Heart sounds: Normal heart sounds. No murmur heard. Pulmonary:      Effort: Pulmonary effort is normal.      Breath sounds: Normal breath sounds. No wheezing, rhonchi or rales. Abdominal:      General: Abdomen is flat.  Bowel sounds are normal. There is no distension. Palpations: Abdomen is soft. Tenderness: There is no abdominal tenderness. There is no guarding or rebound. Musculoskeletal:      Right lower leg: No edema. Left lower leg: No edema. Lymphadenopathy:      Cervical: Cervical adenopathy present. Skin:     Findings: No rash. Neurological:      General: No focal deficit present. Mental Status: She is alert and oriented to person, place, and time. Cranial Nerves: No cranial nerve deficit. Psychiatric:         Mood and Affect: Mood normal.         Behavior: Behavior normal.         Thought Content: Thought content normal.         Judgment: Judgment normal.         Pertinent Laboratory/Diagnostic Studies:  Lab Results   Component Value Date    BUN 8 04/26/2023    CREATININE 0.65 04/26/2023    CALCIUM 8.3 (L) 04/26/2023    K 3.3 (L) 04/26/2023    CO2 26 04/26/2023     04/26/2023     Lab Results   Component Value Date    ALT 13 04/26/2023    AST 16 04/26/2023    ALKPHOS 59 04/26/2023       Lab Results   Component Value Date    WBC 7.27 04/26/2023    HGB 12.1 04/26/2023    HCT 36.7 04/26/2023    MCV 88 04/26/2023     04/26/2023       No results found for: "TSH"    No results found for: "CHOL"  No results found for: "TRIG"  No results found for: "HDL"  No results found for: "LDLCALC"  No results found for: "HGBA1C"    Results for orders placed or performed in visit on 09/18/23   POCT rapid strepA   Result Value Ref Range     RAPID STREP A Negative Negative       Orders Placed This Encounter   Procedures   • POCT rapid strepA       ALLERGIES:  Allergies   Allergen Reactions   • Amoxicillin Hives       Current Medications     Current Outpatient Medications   Medication Sig Dispense Refill   • azithromycin (Zithromax) 250 mg tablet Take 2 tablets (500 mg total) by mouth daily for 1 day, THEN 1 tablet (250 mg total) daily for 4 days.  6 tablet 0   • cetirizine (ZyrTEC) 10 mg tablet Take 1 tablet (10 mg total) by mouth daily for 10 doses As needed for nasal congestion and postnasal drip 10 tablet 0     No current facility-administered medications for this visit.          Health Maintenance     Health Maintenance   Topic Date Due   • Hepatitis C Screening  Never done   • DTaP,Tdap,and Td Vaccines (1 - Tdap) Never done   • HPV Vaccine (1 - 2-dose series) Never done   • HIV Screening  Never done   • Chlamydia Screening  Never done   • BMI: Followup Plan  Never done   • Annual Physical  Never done   • COVID-19 Vaccine (3 - Pfizer series) 07/04/2021   • Influenza Vaccine (1) 09/01/2023   • Depression Screening  04/18/2024   • BMI: Adult  09/18/2024   • Pneumococcal Vaccine: Pediatrics (0 to 5 Years) and At-Risk Patients (6 to 59 Years)  Aged Out   • HIB Vaccine  Aged Out   • IPV Vaccine  Aged Out   • Hepatitis A Vaccine  Aged Out   • Meningococcal ACWY Vaccine  Aged [employer] History   Administered Date(s) Administered   • COVID-19 PFIZER VACCINE 0.3 ML IM 04/18/2021, 05/09/2021   • Tuberculin Skin Test-PPD Intradermal 08/16/2022, 01/09/2023, 09/36/3200       Topher Jordan MD

## 2024-01-22 ENCOUNTER — CLINICAL SUPPORT (OUTPATIENT)
Dept: FAMILY MEDICINE CLINIC | Facility: CLINIC | Age: 24
End: 2024-01-22
Payer: COMMERCIAL

## 2024-01-22 DIAGNOSIS — Z11.1 SCREENING FOR TUBERCULOSIS: Primary | ICD-10-CM

## 2024-01-22 PROCEDURE — 86580 TB INTRADERMAL TEST: CPT

## 2024-01-26 ENCOUNTER — CLINICAL SUPPORT (OUTPATIENT)
Dept: FAMILY MEDICINE CLINIC | Facility: CLINIC | Age: 24
End: 2024-01-26
Payer: COMMERCIAL

## 2024-01-26 DIAGNOSIS — Z11.1 SCREENING FOR TUBERCULOSIS: Primary | ICD-10-CM

## 2024-01-26 PROCEDURE — 86580 TB INTRADERMAL TEST: CPT

## 2024-01-29 LAB
INDURATION: 0 MM
TB SKIN TEST: NEGATIVE

## 2024-03-21 ENCOUNTER — OFFICE VISIT (OUTPATIENT)
Dept: FAMILY MEDICINE CLINIC | Facility: CLINIC | Age: 24
End: 2024-03-21
Payer: COMMERCIAL

## 2024-03-21 VITALS
TEMPERATURE: 97.8 F | HEART RATE: 91 BPM | OXYGEN SATURATION: 98 % | SYSTOLIC BLOOD PRESSURE: 122 MMHG | RESPIRATION RATE: 18 BRPM | WEIGHT: 200 LBS | BODY MASS INDEX: 31.39 KG/M2 | HEIGHT: 67 IN | DIASTOLIC BLOOD PRESSURE: 82 MMHG

## 2024-03-21 DIAGNOSIS — J02.9 PHARYNGITIS, UNSPECIFIED ETIOLOGY: Primary | ICD-10-CM

## 2024-03-21 DIAGNOSIS — R05.1 ACUTE COUGH: ICD-10-CM

## 2024-03-21 DIAGNOSIS — J02.9 SORE THROAT: ICD-10-CM

## 2024-03-21 LAB
S PYO DNA THROAT QL NAA+PROBE: NOT DETECTED
SARS-COV-2 AG UPPER RESP QL IA: NEGATIVE
VALID CONTROL: NORMAL

## 2024-03-21 PROCEDURE — 87651 STREP A DNA AMP PROBE: CPT | Performed by: STUDENT IN AN ORGANIZED HEALTH CARE EDUCATION/TRAINING PROGRAM

## 2024-03-21 PROCEDURE — 87070 CULTURE OTHR SPECIMN AEROBIC: CPT | Performed by: STUDENT IN AN ORGANIZED HEALTH CARE EDUCATION/TRAINING PROGRAM

## 2024-03-21 PROCEDURE — 99213 OFFICE O/P EST LOW 20 MIN: CPT | Performed by: STUDENT IN AN ORGANIZED HEALTH CARE EDUCATION/TRAINING PROGRAM

## 2024-03-21 PROCEDURE — 87811 SARS-COV-2 COVID19 W/OPTIC: CPT | Performed by: STUDENT IN AN ORGANIZED HEALTH CARE EDUCATION/TRAINING PROGRAM

## 2024-03-21 RX ORDER — AZITHROMYCIN 250 MG/1
TABLET, FILM COATED ORAL DAILY
Qty: 6 TABLET | Refills: 0 | Status: SHIPPED | OUTPATIENT
Start: 2024-03-21 | End: 2024-03-26

## 2024-03-21 NOTE — PROGRESS NOTES
Name: Ana Mohamud      : 2000      MRN: [id number]  Encounter Provider: Thalia Guillen MD  Encounter Date: 3/21/2024   Encounter department: [facility]    Assessment & Plan     1. Pharyngitis, unspecified etiology  -     Throat culture  -     azithromycin (Zithromax) 250 mg tablet; Take 2 tablets (500 mg total) by mouth daily for 1 day, THEN 1 tablet (250 mg total) daily for 4 days.    2. Sore throat  -     POCT rapid PCR strepA  -     Throat culture    3. Acute cough  -     POCT Rapid Covid Ag    Rapid strep and COVID in office negative we will send for throat culture  Start azithromycin to be taken as directed, increase hydration, warm salt water gargles  Discussed with patient given the frequent infections she may benefit from tonsillectomy, states has ENT referral    Depression Screening and Follow-up Plan: Patient was screened for depression during today's encounter. They screened negative with a PHQ-2 score of 0.    Follow-up as needed    Subjective      Ana is a 23-year-old female who presents to the office today for sick visit  Patient reports frequent strep infections and over the last few days has noted sore throat worsening with difficulty swallowing today due to pain.  Noted tonsils red and inflamed  Works at   Unsure of any sick contacts  Has not taken anything for this  Denies any fever, does have green productive cough    Cough  Associated symptoms include a sore throat. Pertinent negatives include no chest pain, fever or shortness of breath.     Review of Systems   Constitutional:  Negative for appetite change and fever.   HENT:  Positive for congestion and sore throat.    Respiratory:  Positive for cough. Negative for chest tightness and shortness of breath.    Cardiovascular:  Negative for chest pain.   Gastrointestinal:  Negative for abdominal pain.       Current Outpatient Medications on File Prior to Visit   Medication Sig    cetirizine  "(ZyrTEC) 10 mg tablet Take 1 tablet (10 mg total) by mouth daily for 10 doses As needed for nasal congestion and postnasal drip       Objective     /82   Pulse 91   Temp 97.8 °F (36.6 °C)   Resp 18   Ht 5' 7\" (1.702 m)   Wt 90.7 kg (200 lb)   SpO2 98%   BMI 31.32 kg/m²     Physical Exam  Vitals reviewed.   HENT:      Mouth/Throat:      Pharynx: Oropharyngeal exudate and posterior oropharyngeal erythema present.   Cardiovascular:      Rate and Rhythm: Normal rate and regular rhythm.      Pulses: Normal pulses.   Pulmonary:      Effort: Pulmonary effort is normal.      Breath sounds: Normal breath sounds.   Musculoskeletal:      Cervical back: Neck supple.   Neurological:      Mental Status: She is alert.   Psychiatric:         Mood and Affect: Mood normal.       Thalia Guillen MD    "

## 2024-03-23 LAB — BACTERIA THROAT CULT: NORMAL

## 2024-08-23 ENCOUNTER — OFFICE VISIT (OUTPATIENT)
Dept: FAMILY MEDICINE CLINIC | Facility: CLINIC | Age: 24
End: 2024-08-23
Payer: COMMERCIAL

## 2024-08-23 VITALS
DIASTOLIC BLOOD PRESSURE: 80 MMHG | OXYGEN SATURATION: 98 % | BODY MASS INDEX: 31.48 KG/M2 | TEMPERATURE: 98 F | WEIGHT: 200.6 LBS | SYSTOLIC BLOOD PRESSURE: 120 MMHG | HEART RATE: 89 BPM | HEIGHT: 67 IN | RESPIRATION RATE: 18 BRPM

## 2024-08-23 DIAGNOSIS — B35.9 TINEA: Primary | ICD-10-CM

## 2024-08-23 DIAGNOSIS — B35.9 TINEA: ICD-10-CM

## 2024-08-23 DIAGNOSIS — G89.29 CHRONIC MIDLINE THORACIC BACK PAIN: ICD-10-CM

## 2024-08-23 DIAGNOSIS — M54.6 CHRONIC MIDLINE THORACIC BACK PAIN: ICD-10-CM

## 2024-08-23 PROCEDURE — 99213 OFFICE O/P EST LOW 20 MIN: CPT | Performed by: FAMILY MEDICINE

## 2024-08-23 RX ORDER — CLOTRIMAZOLE AND BETAMETHASONE DIPROPIONATE 10; .64 MG/G; MG/G
1 CREAM TOPICAL 2 TIMES DAILY
Qty: 45 G | Refills: 2 | OUTPATIENT
Start: 2024-08-23

## 2024-08-23 RX ORDER — CLOTRIMAZOLE AND BETAMETHASONE DIPROPIONATE 10; .64 MG/G; MG/G
CREAM TOPICAL 2 TIMES DAILY
Qty: 45 G | Refills: 2 | Status: SHIPPED | OUTPATIENT
Start: 2024-08-23

## 2024-08-23 RX ORDER — KETOCONAZOLE 20 MG/G
CREAM TOPICAL 2 TIMES DAILY
Qty: 45 G | Refills: 2 | Status: SHIPPED | OUTPATIENT
Start: 2024-08-23

## 2024-08-23 NOTE — ASSESSMENT & PLAN NOTE
Back pain.  Patient was given referral to [facility]'s plastic surgery department to discuss breast reduction surgery

## 2024-08-23 NOTE — PROGRESS NOTES
"   FAMILY PRACTICE OFFICE VISIT       NAME: Ana Mohamud  AGE: 23 y.o. SEX: female       : 2000        MRN: [id number]    DATE: 2024  TIME: 12:37 PM    Assessment and Plan     Problem List Items Addressed This Visit       Tinea - Primary     Tinea.  Patient given prescription for Lotrisone cream to apply twice daily to the affected area.         Relevant Medications    clotrimazole-betamethasone (LOTRISONE) 1-0.05 % cream    Chronic midline thoracic back pain     Back pain.  Patient was given referral to [facility] plastic surgery department to discuss breast reduction surgery         Relevant Orders    Ambulatory Referral to Plastic Surgery           Chief Complaint     Chief Complaint   Patient presents with    Discuss breast reduction      Patient states \" Im having neck and shoulder pain\"       History of Present Illness     Patient in the office with 1 to 2-year complaints of chronic upper back pain which she relates to her 38DD breast size.  She also gets discomfort along her shoulders where her bra straps digging into her skin.  Patient also experiences chronic intermittent pruritic red rash underneath her breasts.  Patient would like consultation with plastic surgery to discuss breast reduction surgery.  Patient will be entering her senior year at [employer].  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    Respiratory: Negative.     Cardiovascular: Negative.    Gastrointestinal: Negative.    Musculoskeletal:  Positive for back pain, neck pain and neck stiffness.       Active Problem List     Patient Active Problem List   Diagnosis    Pharyngitis    Tinea    Chronic midline thoracic back pain       Past Medical History:  Past Medical History:   Diagnosis Date    Asthma        Past Surgical " History:  Past Surgical History:   Procedure Laterality Date    WISDOM TOOTH EXTRACTION         Family History:  Family History   Problem Relation Age of Onset    No Known Problems Mother     Hypertension Father     Diabetes Father     No Known Problems Brother        Social History:  Social History     Socioeconomic History    Marital status: Single     Spouse name: Not on file    Number of children: Not on file    Years of education: Not on file    Highest education level: Not on file   Occupational History    Not on file   Tobacco Use    Smoking status: Never    Smokeless tobacco: Never   Vaping Use    Vaping status: Never Used   Substance and Sexual Activity    Alcohol use: Not Currently    Drug use: Never    Sexual activity: Not Currently   Other Topics Concern    Not on file   Social History Narrative    Not on file     Social Determinants of Health     Financial Resource Strain: Not on file   Food Insecurity: Not on file   Transportation Needs: Not on file   Physical Activity: Not on file   Stress: Not on file   Social Connections: Not on file   Intimate Partner Violence: Not on file   Housing Stability: Not on file       Objective     Vitals:    08/23/24 1047   BP: 120/80   Pulse: 89   Resp: 18   Temp: 98 °F (36.7 °C)   SpO2: 98%     Wt Readings from Last 3 Encounters:   08/23/24 91 kg (200 lb 9.6 oz)   03/21/24 90.7 kg (200 lb)   09/18/23 85.3 kg (188 lb)       Physical Exam  Constitutional:       General: She is not in acute distress.     Appearance: Normal appearance. She is not ill-appearing.   HENT:      Head: Normocephalic and atraumatic.   Eyes:      General:         Right eye: No discharge.         Left eye: No discharge.      Extraocular Movements: Extraocular movements intact.      Conjunctiva/sclera: Conjunctivae normal.      Pupils: Pupils are equal, round, and reactive to light.   Neck:      Vascular: No carotid bruit.   Cardiovascular:      Rate and Rhythm: Normal rate and regular rhythm.       "Heart sounds: Normal heart sounds. No murmur heard.  Pulmonary:      Effort: Pulmonary effort is normal.      Breath sounds: Normal breath sounds. No wheezing, rhonchi or rales.   Abdominal:      General: Abdomen is flat. Bowel sounds are normal. There is no distension.      Palpations: Abdomen is soft.      Tenderness: There is no abdominal tenderness. There is no guarding or rebound.   Musculoskeletal:      Right lower leg: No edema.      Left lower leg: No edema.   Lymphadenopathy:      Cervical: No cervical adenopathy.   Skin:     Findings: No rash.   Neurological:      General: No focal deficit present.      Mental Status: She is alert and oriented to person, place, and time.      Cranial Nerves: No cranial nerve deficit.   Psychiatric:         Mood and Affect: Mood normal.         Behavior: Behavior normal.         Thought Content: Thought content normal.         Judgment: Judgment normal.         Pertinent Laboratory/Diagnostic Studies:  Lab Results   Component Value Date    BUN 8 04/26/2023    CREATININE 0.65 04/26/2023    CALCIUM 8.3 (L) 04/26/2023    K 3.3 (L) 04/26/2023    CO2 26 04/26/2023     04/26/2023     Lab Results   Component Value Date    ALT 13 04/26/2023    AST 16 04/26/2023    ALKPHOS 59 04/26/2023       Lab Results   Component Value Date    WBC 7.27 04/26/2023    HGB 12.1 04/26/2023    HCT 36.7 04/26/2023    MCV 88 04/26/2023     04/26/2023       No results found for: \"TSH\"    No results found for: \"CHOL\"  No results found for: \"TRIG\"  No results found for: \"HDL\"  No results found for: \"LDLCALC\"  No results found for: \"HGBA1C\"    Results for orders placed or performed in visit on 03/21/24   Throat culture    Specimen: Throat   Result Value Ref Range    Throat Culture Negative for beta-hemolytic Streptococcus    POCT rapid PCR strepA   Result Value Ref Range    RAPID PCR STREP A Not Detected Not Detected   POCT Rapid Covid Ag   Result Value Ref Range    POCT SARS-CoV-2 Ag Negative " Negative    VALID CONTROL Valid        Orders Placed This Encounter   Procedures    Ambulatory Referral to Plastic Surgery       ALLERGIES:  Allergies   Allergen Reactions    Amoxicillin Hives     Does not remember   Happened when 5 years old       Current Medications     Current Outpatient Medications   Medication Sig Dispense Refill    clotrimazole-betamethasone (LOTRISONE) 1-0.05 % cream Apply topically 2 (two) times a day 45 g 2    cetirizine (ZyrTEC) 10 mg tablet Take 1 tablet (10 mg total) by mouth daily for 10 doses As needed for nasal congestion and postnasal drip 10 tablet 0     No current facility-administered medications for this visit.         Health Maintenance     Health Maintenance   Topic Date Due    Hepatitis C Screening  Never done    HIV Screening  Never done    HPV Vaccine (1 - 3-dose series) Never done    Chlamydia Screening  Never done    Annual Physical  Never done    COVID-19 Vaccine (3 - 2023-24 season) 09/01/2023    DTaP,Tdap,and Td Vaccines (1 - Tdap) Never done    Cervical Cancer Screening  Never done    Influenza Vaccine (1) 09/01/2024    Depression Screening  03/21/2025    Zoster Vaccine (1 of 2) 11/23/2052    RSV Vaccine Age 60+ Years (1 - 1-dose 60+ series) 11/23/2062    RSV Vaccine age 0-20 Months  Aged Out    Pneumococcal Vaccine: Pediatrics (0 to 5 Years) and At-Risk Patients (6 to 64 Years)  Aged Out    HIB Vaccine  Aged Out    IPV Vaccine  Aged Out    Hepatitis A Vaccine  Aged Out    Meningococcal ACWY Vaccine  Aged Out     Immunization History   Administered Date(s) Administered    COVID-19 PFIZER VACCINE 0.3 ML IM 04/18/2021, 05/09/2021    Tuberculin Skin Test-PPD Intradermal 08/16/2022, 01/09/2023, 08/29/2023, 01/22/2024, 01/26/2024       Byron John MD

## 2024-09-19 ENCOUNTER — OFFICE VISIT (OUTPATIENT)
Dept: FAMILY MEDICINE CLINIC | Facility: CLINIC | Age: 24
End: 2024-09-19
Payer: COMMERCIAL

## 2024-09-19 VITALS
TEMPERATURE: 98.2 F | HEART RATE: 77 BPM | WEIGHT: 194.4 LBS | SYSTOLIC BLOOD PRESSURE: 128 MMHG | DIASTOLIC BLOOD PRESSURE: 90 MMHG | RESPIRATION RATE: 18 BRPM | BODY MASS INDEX: 30.45 KG/M2 | OXYGEN SATURATION: 97 %

## 2024-09-19 DIAGNOSIS — H60.502 ACUTE NONINFECTIVE OTITIS EXTERNA OF LEFT EAR, UNSPECIFIED TYPE: Primary | ICD-10-CM

## 2024-09-19 PROCEDURE — 99213 OFFICE O/P EST LOW 20 MIN: CPT | Performed by: FAMILY MEDICINE

## 2024-09-19 RX ORDER — NEOMYCIN SULFATE, POLYMYXIN B SULFATE AND HYDROCORTISONE 10; 3.5; 1 MG/ML; MG/ML; [USP'U]/ML
4 SUSPENSION/ DROPS AURICULAR (OTIC) 3 TIMES DAILY
Qty: 10 ML | Refills: 0 | Status: SHIPPED | OUTPATIENT
Start: 2024-09-19

## 2024-09-19 NOTE — ASSESSMENT & PLAN NOTE
Otitis externa.  Patient given prescription for Cortisporin otic suspension to use 4 drops 3 times daily for up to 7 days if needed.  Patient will call if symptoms persist after medication completed

## 2024-09-19 NOTE — PROGRESS NOTES
FAMILY PRACTICE OFFICE VISIT       NAME: Ana Mohamud  AGE: 23 y.o. SEX: female       : 2000        MRN: [id number]    DATE: 2024  TIME: 12:19 PM    Assessment and Plan     Problem List Items Addressed This Visit       Acute noninfective otitis externa of left ear - Primary     Otitis externa.  Patient given prescription for Cortisporin otic suspension to use 4 drops 3 times daily for up to 7 days if needed.  Patient will call if symptoms persist after medication completed         Relevant Medications    neomycin-polymyxin-hydrocortisone (CORTISPORIN) 0.35%-10,000 units/mL-1% otic suspension           Chief Complaint     Chief Complaint   Patient presents with    Possible Ear Infection       History of Present Illness     Patient with several days history of left ear discomfort.  Patient tends to use Q-tips to clean her ears and felt swelling and difficulty using a Q-tip.  She denies any recent illness with sore throat fevers or coughing.  She describes mild muffled hearing of left ear        Review of Systems   Review of Systems   Constitutional: Negative.    HENT:  Positive for ear pain.    Respiratory: Negative.     Cardiovascular: Negative.    Gastrointestinal: Negative.        Active Problem List     Patient Active Problem List   Diagnosis    Pharyngitis    Tinea    Chronic midline thoracic back pain    Acute noninfective otitis externa of left ear       Past Medical History:  Past Medical History:   Diagnosis Date    Asthma        Past Surgical History:  Past Surgical History:   Procedure Laterality Date    WISDOM TOOTH EXTRACTION         Family History:  Family History   Problem Relation Age of Onset    No Known Problems Mother     Hypertension Father     Diabetes Father     No Known Problems Brother        Social History:  Social History     Socioeconomic History    Marital status: Single     Spouse name: Not on file    Number of children: Not on file    Years of education: Not on file     Highest education level: Not on file   Occupational History    Not on file   Tobacco Use    Smoking status: Never    Smokeless tobacco: Never   Vaping Use    Vaping status: Never Used   Substance and Sexual Activity    Alcohol use: Not Currently    Drug use: Never    Sexual activity: Not Currently   Other Topics Concern    Not on file   Social History Narrative    Not on file     Social Determinants of Health     Financial Resource Strain: Not on file   Food Insecurity: Not on file   Transportation Needs: Not on file   Physical Activity: Not on file   Stress: Not on file   Social Connections: Not on file   Intimate Partner Violence: Not on file   Housing Stability: Not on file       Objective     Vitals:    09/19/24 1151   BP: 128/90   Pulse: 77   Resp: 18   Temp: 98.2 °F (36.8 °C)   SpO2: 97%     Wt Readings from Last 3 Encounters:   09/19/24 88.2 kg (194 lb 6.4 oz)   08/23/24 91 kg (200 lb 9.6 oz)   03/21/24 90.7 kg (200 lb)       Physical Exam  Constitutional:       Appearance: Normal appearance.   HENT:      Right Ear: Tympanic membrane, ear canal and external ear normal. There is no impacted cerumen.      Left Ear: Tympanic membrane and external ear normal. There is no impacted cerumen.      Ears:      Comments: Patient with some mild ear canal swelling of left ear.  Tympanic membrane within normal limits     Mouth/Throat:      Mouth: Mucous membranes are moist.      Pharynx: No oropharyngeal exudate or posterior oropharyngeal erythema.   Eyes:      General:         Right eye: No discharge.         Left eye: No discharge.      Extraocular Movements: Extraocular movements intact.      Conjunctiva/sclera: Conjunctivae normal.      Pupils: Pupils are equal, round, and reactive to light.   Cardiovascular:      Rate and Rhythm: Normal rate and regular rhythm.      Heart sounds: Normal heart sounds. No murmur heard.  Pulmonary:      Effort: Pulmonary effort is normal. No respiratory distress.      Breath sounds:  "Normal breath sounds. No wheezing, rhonchi or rales.   Neurological:      Mental Status: She is alert.         Pertinent Laboratory/Diagnostic Studies:  Lab Results   Component Value Date    BUN 8 04/26/2023    CREATININE 0.65 04/26/2023    CALCIUM 8.3 (L) 04/26/2023    K 3.3 (L) 04/26/2023    CO2 26 04/26/2023     04/26/2023     Lab Results   Component Value Date    ALT 13 04/26/2023    AST 16 04/26/2023    ALKPHOS 59 04/26/2023       Lab Results   Component Value Date    WBC 7.27 04/26/2023    HGB 12.1 04/26/2023    HCT 36.7 04/26/2023    MCV 88 04/26/2023     04/26/2023       No results found for: \"TSH\"    No results found for: \"CHOL\"  No results found for: \"TRIG\"  No results found for: \"HDL\"  No results found for: \"LDLCALC\"  No results found for: \"HGBA1C\"    Results for orders placed or performed in visit on 03/21/24   Throat culture    Specimen: Throat   Result Value Ref Range    Throat Culture Negative for beta-hemolytic Streptococcus    POCT rapid PCR strepA   Result Value Ref Range    RAPID PCR STREP A Not Detected Not Detected   POCT Rapid Covid Ag   Result Value Ref Range    POCT SARS-CoV-2 Ag Negative Negative    VALID CONTROL Valid        No orders of the defined types were placed in this encounter.      ALLERGIES:  Allergies   Allergen Reactions    Amoxicillin Hives     Does not remember   Happened when 5 years old       Current Medications     Current Outpatient Medications   Medication Sig Dispense Refill    clotrimazole-betamethasone (LOTRISONE) 1-0.05 % cream Apply topically 2 (two) times a day 45 g 2    ketoconazole (NIZORAL) 2 % cream Apply topically 2 (two) times a day 45 g 2    neomycin-polymyxin-hydrocortisone (CORTISPORIN) 0.35%-10,000 units/mL-1% otic suspension Administer 4 drops into the left ear 3 (three) times a day 10 mL 0    cetirizine (ZyrTEC) 10 mg tablet Take 1 tablet (10 mg total) by mouth daily for 10 doses As needed for nasal congestion and postnasal drip 10 tablet 0 "     No current facility-administered medications for this visit.         Health Maintenance     Health Maintenance   Topic Date Due    Hepatitis C Screening  Never done    HIV Screening  Never done    HPV Vaccine (1 - 3-dose series) Never done    Chlamydia Screening  Never done    Annual Physical  Never done    DTaP,Tdap,and Td Vaccines (1 - Tdap) Never done    Cervical Cancer Screening  Never done    COVID-19 Vaccine (3 - 2023-24 season) 09/01/2024    Influenza Vaccine (1) 09/01/2024    Depression Screening  03/21/2025    Zoster Vaccine (1 of 2) 11/23/2052    RSV Vaccine Age 60+ Years (1 - 1-dose 60+ series) 11/23/2062    RSV Vaccine age 0-20 Months  Aged Out    Pneumococcal Vaccine: Pediatrics (0 to 5 Years) and At-Risk Patients (6 to 64 Years)  Aged Out    HIB Vaccine  Aged Out    IPV Vaccine  Aged Out    Hepatitis A Vaccine  Aged Out    Meningococcal ACWY Vaccine  Aged Out     Immunization History   Administered Date(s) Administered    COVID-19 PFIZER VACCINE 0.3 ML IM 04/18/2021, 05/09/2021    Tuberculin Skin Test-PPD Intradermal 08/16/2022, 01/09/2023, 08/29/2023, 01/22/2024, 01/26/2024       Byron John MD

## 2024-09-27 ENCOUNTER — TELEPHONE (OUTPATIENT)
Age: 24
End: 2024-09-27

## 2024-09-27 NOTE — TELEPHONE ENCOUNTER
Reached out to patient as it was brought to my attention that I had scheduled a consult with Dr. Holloway in [place] for 10/2. I explained my error to patient and also explained that in order to submit this procedure to insurance for coverage, there is certain criteria to be met beforehand. Patient verbalized understanding and will wait to be contacted by Kristie HUTTON

## 2024-09-30 ENCOUNTER — TELEPHONE (OUTPATIENT)
Dept: PLASTIC SURGERY | Facility: CLINIC | Age: 24
End: 2024-09-30

## 2024-09-30 NOTE — TELEPHONE ENCOUNTER
Emailed patient criteria for breast reduction to review and provided my number to call and discuss.  Need all criteria to be met.

## 2024-10-01 ENCOUNTER — TELEPHONE (OUTPATIENT)
Dept: PLASTIC SURGERY | Facility: CLINIC | Age: 24
End: 2024-10-01

## 2024-10-01 NOTE — TELEPHONE ENCOUNTER
Mom calling in to f/u as she isn't able to confirm with insurance what might be covered until she provides insurance with provider ID #.     Mom confirms she believes pt will want to stick with Dr. Holloway as he was recommended to her, hoping to hear back so she can f/u with insurance asap, adds she will be in meeting for the next hour so VM is ok. Routing for review.

## 2024-10-01 NOTE — TELEPHONE ENCOUNTER
Had received message from patient's mom that the insurance company needs Dr. Holloway's provider id number before they can discuss any type of coverage for a breast reduction consultation.  I provided Dr. Holloway's NPI number and the patient informed me that it is ok to speak to her Mom about anything regarding a breast reduction.

## 2024-10-19 PROBLEM — H60.502 ACUTE NONINFECTIVE OTITIS EXTERNA OF LEFT EAR: Status: RESOLVED | Noted: 2024-09-19 | Resolved: 2024-10-19

## 2025-06-02 ENCOUNTER — HOSPITAL ENCOUNTER (EMERGENCY)
Facility: HOSPITAL | Age: 25
Discharge: HOME | End: 2025-06-02
Attending: EMERGENCY MEDICINE | Admitting: EMERGENCY MEDICINE
Payer: COMMERCIAL

## 2025-06-02 VITALS
SYSTOLIC BLOOD PRESSURE: 157 MMHG | TEMPERATURE: 99 F | DIASTOLIC BLOOD PRESSURE: 98 MMHG | RESPIRATION RATE: 18 BRPM | HEART RATE: 104 BPM | OXYGEN SATURATION: 99 %

## 2025-06-02 DIAGNOSIS — N92.3 VAGINAL BLEEDING BETWEEN PERIODS: Primary | ICD-10-CM

## 2025-06-02 LAB
BACTERIA UR QL AUTO: NORMAL /HPF
BILIRUB UR QL STRIP: NEGATIVE
CLARITY UR: CLEAR
COLOR UR: COLORLESS
EXT PREGNANCY TEST URINE: NEGATIVE
EXT. CONTROL: NORMAL
GLUCOSE UR STRIP-MCNC: NEGATIVE MG/DL
HGB UR QL STRIP.AUTO: ABNORMAL
KETONES UR STRIP-MCNC: NEGATIVE MG/DL
LEUKOCYTE ESTERASE UR QL STRIP: NEGATIVE
NITRITE UR QL STRIP: NEGATIVE
NON-SQ EPI CELLS URNS QL MICRO: NORMAL /HPF
PH UR STRIP.AUTO: 6 [PH]
PROT UR STRIP-MCNC: NEGATIVE MG/DL
RBC #/AREA URNS AUTO: NORMAL /HPF
SP GR UR STRIP.AUTO: 1 (ref 1–1.03)
UROBILINOGEN UR STRIP-ACNC: <2 MG/DL
WBC #/AREA URNS AUTO: NORMAL /HPF

## 2025-06-02 PROCEDURE — 81025 URINE PREGNANCY TEST: CPT

## 2025-06-02 PROCEDURE — 99284 EMERGENCY DEPT VISIT MOD MDM: CPT

## 2025-06-02 PROCEDURE — 99284 EMERGENCY DEPT VISIT MOD MDM: CPT | Performed by: EMERGENCY MEDICINE

## 2025-06-02 PROCEDURE — 81001 URINALYSIS AUTO W/SCOPE: CPT

## 2025-06-03 NOTE — ED PROVIDER NOTES
Time reflects when diagnosis was documented in both MDM as applicable and the Disposition within this note       Time User Action Codes Description Comment    6/2/2025  9:39 PM Gi Centeno Add [N92.3] Vaginal bleeding between periods           ED Disposition       ED Disposition   Discharge    Condition   Stable    Date/Time   Mon Jun 2, 2025  9:39 PM    Comment   Ana Mohamud discharge to home/self care.                   Assessment & Plan       Medical Decision Making  History of very irregular menses and slight change in these from baseline with lighter flow during last cycle and now a couple of days with small amount of spotting.  No discomfort or other symptoms during this.  Pregnancy test is negative.    Explained that variation in hormone level secondary to stress, cysts, and other processes can lead to irregular bleeding.  Have advised that she establish care with an OB/GYN and keep record of her bleeding.  Reviewed signs and symptoms for which to reseek medical attention such as very heavy bleeding, that with significant discomfort, fever or other concerns.             Medications - No data to display    ED Risk Strat Scores                    No data recorded                            History of Present Illness       Chief Complaint   Patient presents with    Vaginal Bleeding     Reports irregular bleeding today, last menstrual cycle may 23th, reports today having slight vaginal bleeding when wiping but not enough to put a tampon in.  Pt denies abdominal or vaginal pain. Pt denies frequency, urgency, UTI symptoms. Pt also reports period was lighter than normal         Past Medical History[1]   Past Surgical History[2]   Family History[3]   Social History[4]   E-Cigarette/Vaping    E-Cigarette Use Never User       E-Cigarette/Vaping Substances    Nicotine No     THC No     CBD No     Flavoring No     Other No     Unknown No       I have reviewed and agree with the history as documented.      Ana is a 24-year-old female who presents to the emergency department concerned about irregular vaginal bleeding.  She explains that her menses are usually regular and the last 1 ended on May 23.  It was normally timed although the flow was lighter than typical.  Yesterday on 1 occasion and today on 1 occasion after voiding she appreciated a very small amount of pink blood.  She has not appreciated any further bleeding/discharge, experience any genital discomfort, abdominal discomfort, low back pain, lightheadedness or dizziness.  No known personal history of ovarian cysts.  No personal history of pregnancy.  Family history of ovarian cysts.  Denies ongoing medical conditions. she does not currently have an OB/GYN.        Review of Systems   Genitourinary:  Negative for dysuria, frequency, hematuria and urgency.   All other systems reviewed and are negative.          Objective       ED Triage Vitals   Temperature Pulse Blood Pressure Respirations SpO2 Patient Position - Orthostatic VS   06/02/25 2018 06/02/25 2017 06/02/25 2017 06/02/25 2017 06/02/25 2017 --   99 °F (37.2 °C) 104 157/98 18 99 %       Temp Source Heart Rate Source BP Location FiO2 (%) Pain Score    06/02/25 2018 -- -- -- --    Oral          Vitals      Date and Time Temp Pulse SpO2 Resp BP Pain Score FACES Pain Rating User   06/02/25 2018 99 °F (37.2 °C) -- -- -- -- -- -- XOCHITL   06/02/25 2017 -- 104 99 % 18 157/98 -- -- JLZ            Physical Exam  Vitals and nursing note reviewed.   Constitutional:       Appearance: Normal appearance.   HENT:      Head: Normocephalic.      Mouth/Throat:      Mouth: Mucous membranes are moist.     Eyes:      General: No scleral icterus.     Extraocular Movements: Extraocular movements intact.      Conjunctiva/sclera: Conjunctivae normal.       Cardiovascular:      Rate and Rhythm: Normal rate and regular rhythm.      Heart sounds: Normal heart sounds.   Pulmonary:      Effort: Pulmonary effort is normal.       Breath sounds: Normal breath sounds.   Abdominal:      Palpations: Abdomen is soft.      Tenderness: There is no abdominal tenderness.     Skin:     General: Skin is warm and dry.     Neurological:      Mental Status: She is alert and oriented to person, place, and time.     Psychiatric:         Mood and Affect: Mood normal.         Results Reviewed       Procedure Component Value Units Date/Time    Urine Microscopic [[id number]]  (Normal) Collected: 06/02/25 2033    Lab Status: Final result Specimen: Urine, Clean Catch Updated: 06/02/25 2101     RBC, UA None Seen /hpf      WBC, UA None Seen /hpf      Epithelial Cells Occasional /hpf      Bacteria, UA Occasional /hpf     UA (URINE) with reflex to Scope [[id number]]  (Abnormal) Collected: 06/02/25 2033    Lab Status: Final result Specimen: Urine, Clean Catch Updated: 06/02/25 2059     Color, UA Colorless     Clarity, UA Clear     Specific Gravity, UA 1.001     pH, UA 6.0     Leukocytes, UA Negative     Nitrite, UA Negative     Protein, UA Negative mg/dl      Glucose, UA Negative mg/dl      Ketones, UA Negative mg/dl      Urobilinogen, UA <2.0 mg/dl      Bilirubin, UA Negative     Occult Blood, UA Small    POCT pregnancy, urine [[id number]]  (Normal) Collected: 06/02/25 2045    Lab Status: Final result Updated: 06/02/25 2045     EXT Preg Test, Ur Negative     Control Valid            No orders to display       Procedures    ED Medication and Procedure Management   Prior to Admission Medications   Prescriptions Last Dose Informant Patient Reported? Taking?   cetirizine (ZyrTEC) 10 mg tablet   No No   Sig: Take 1 tablet (10 mg total) by mouth daily for 10 doses As needed for nasal congestion and postnasal drip   clotrimazole-betamethasone (LOTRISONE) 1-0.05 % cream  Self No No   Sig: Apply topically 2 (two) times a day   ketoconazole (NIZORAL) 2 % cream  Self No No   Sig: Apply topically 2 (two) times a day   neomycin-polymyxin-hydrocortisone (CORTISPORIN) 0.35%-10,000  units/mL-1% otic suspension   No No   Sig: Administer 4 drops into the left ear 3 (three) times a day      Facility-Administered Medications: None     Discharge Medication List as of 6/2/2025  9:41 PM        CONTINUE these medications which have NOT CHANGED    Details   cetirizine (ZyrTEC) 10 mg tablet Take 1 tablet (10 mg total) by mouth daily for 10 doses As needed for nasal congestion and postnasal drip, Starting Mon 2/5/2018, Until Thu 4/27/2023, Print      clotrimazole-betamethasone (LOTRISONE) 1-0.05 % cream Apply topically 2 (two) times a day, Starting Fri 8/23/2024, Normal      ketoconazole (NIZORAL) 2 % cream Apply topically 2 (two) times a day, Starting Fri 8/23/2024, Normal      neomycin-polymyxin-hydrocortisone (CORTISPORIN) 0.35%-10,000 units/mL-1% otic suspension Administer 4 drops into the left ear 3 (three) times a day, Starting Thu 9/19/2024, Normal           No discharge procedures on file.  ED SEPSIS DOCUMENTATION   Time reflects when diagnosis was documented in both MDM as applicable and the Disposition within this note       Time User Action Codes Description Comment    6/2/2025  9:39 PM Gi Centeno Add [N92.3] Vaginal bleeding between periods                    [1]   Past Medical History:  Diagnosis Date    Asthma    [2]   Past Surgical History:  Procedure Laterality Date    WISDOM TOOTH EXTRACTION     [3]   Family History  Problem Relation Name Age of Onset    No Known Problems Mother      Hypertension Father      Diabetes Father      No Known Problems Brother     [4]   Social History  Tobacco Use    Smoking status: Never    Smokeless tobacco: Never   Vaping Use    Vaping status: Never Used   Substance Use Topics    Alcohol use: Not Currently    Drug use: Never        Gi Centeno MD  06/02/25 [id number]

## 2025-06-03 NOTE — DISCHARGE INSTRUCTIONS
Keep a record of your vaginal bleeding.  Schedule an appointment with an OB/GYN office and reviewed your recent bleeding pattern.    Reseek medical attention if bleeding becomes extremely heavy, you have significant discomfort with bleeding, become lightheaded, have fever or any other worsening along with symptoms.

## 2025-08-08 ENCOUNTER — OFFICE VISIT (OUTPATIENT)
Dept: FAMILY MEDICINE CLINIC | Facility: CLINIC | Age: 25
End: 2025-08-08
Payer: COMMERCIAL

## 2025-08-08 VITALS
HEART RATE: 79 BPM | TEMPERATURE: 97.8 F | BODY MASS INDEX: 32.51 KG/M2 | HEIGHT: 67 IN | SYSTOLIC BLOOD PRESSURE: 110 MMHG | WEIGHT: 207.13 LBS | OXYGEN SATURATION: 99 % | RESPIRATION RATE: 18 BRPM | DIASTOLIC BLOOD PRESSURE: 80 MMHG

## 2025-08-08 DIAGNOSIS — L50.9 URTICARIA: ICD-10-CM

## 2025-08-08 DIAGNOSIS — J02.9 ACUTE PHARYNGITIS, UNSPECIFIED ETIOLOGY: Primary | ICD-10-CM

## 2025-08-08 PROCEDURE — 99213 OFFICE O/P EST LOW 20 MIN: CPT | Performed by: FAMILY MEDICINE

## 2025-08-08 RX ORDER — AZITHROMYCIN 250 MG/1
TABLET, FILM COATED ORAL
Qty: 6 TABLET | Refills: 0 | Status: SHIPPED | OUTPATIENT
Start: 2025-08-08 | End: 2025-08-13

## 2025-08-08 RX ORDER — PREDNISONE 10 MG/1
TABLET ORAL
Qty: 15 TABLET | Refills: 0 | Status: SHIPPED | OUTPATIENT
Start: 2025-08-08